# Patient Record
Sex: MALE | Race: BLACK OR AFRICAN AMERICAN | Employment: UNEMPLOYED | URBAN - METROPOLITAN AREA
[De-identification: names, ages, dates, MRNs, and addresses within clinical notes are randomized per-mention and may not be internally consistent; named-entity substitution may affect disease eponyms.]

---

## 2017-09-01 PROBLEM — G45.9 TIA (TRANSIENT ISCHEMIC ATTACK): Status: ACTIVE | Noted: 2017-09-01

## 2017-09-01 PROBLEM — I10 ESSENTIAL HYPERTENSION: Status: ACTIVE | Noted: 2017-09-01

## 2017-09-01 PROBLEM — I69.90 LATE EFFECTS OF CVA (CEREBROVASCULAR ACCIDENT): Status: ACTIVE | Noted: 2017-09-01

## 2017-09-01 PROBLEM — G62.9 PERIPHERAL NEUROPATHY: Status: ACTIVE | Noted: 2017-09-01

## 2017-09-01 PROBLEM — R55 SYNCOPE: Status: ACTIVE | Noted: 2017-09-01

## 2019-04-29 ENCOUNTER — HOSPITAL ENCOUNTER (OUTPATIENT)
Dept: CT IMAGING | Age: 59
Discharge: HOME | End: 2019-04-29
Payer: MEDICAID

## 2019-04-29 ENCOUNTER — HOSPITAL ENCOUNTER (OUTPATIENT)
Dept: NEUROLOGY | Age: 59
Discharge: HOME | End: 2019-04-29
Payer: MEDICAID

## 2019-04-29 DIAGNOSIS — R56.9 SEIZURE (HCC): ICD-10-CM

## 2019-04-29 PROCEDURE — 95816 EEG AWAKE AND DROWSY: CPT

## 2019-04-29 PROCEDURE — 95816 EEG AWAKE AND DROWSY: CPT | Performed by: PSYCHIATRY & NEUROLOGY

## 2019-04-29 PROCEDURE — 70450 CT HEAD/BRAIN W/O DYE: CPT

## 2019-04-30 NOTE — PROCEDURES
Patient: Darlene Pizarro    MR Number: [id number]  YOB: 1960  Date of Visit: 4/29/2019    Clinical History:  The patient is a 63y.o. years old male with possible recurrent seizures. Current Outpatient Medications   Medication Sig Dispense Refill    atorvastatin (LIPITOR) 40 MG tablet Take 1 tablet by mouth nightly 30 tablet 3    baclofen (LIORESAL) 10 MG tablet Take 0.5 tablets by mouth 2 times daily 60 tablet 0    aspirin 325 MG tablet Take 325 mg by mouth daily      divalproex (DEPAKOTE SPRINKLE) 125 MG capsule Take 125 mg by mouth 2 times daily      famotidine (PEPCID) 20 MG tablet Take 20 mg by mouth 2 times daily      FLUoxetine (PROZAC) 10 MG capsule Take 10 mg by mouth daily      folic acid (FOLVITE) 1 MG tablet Take 1 mg by mouth daily      gabapentin (NEURONTIN) 100 MG capsule Take 100 mg by mouth 3 times daily      medroxyPROGESTERone (PROVERA) 10 MG tablet Take 10 mg by mouth daily      Acetaminophen (TYLENOL) 325 MG CAPS Take 650 mg by mouth 4 times daily as needed (pain)       No current facility-administered medications for this encounter. Method: The EEG was performed utilizing the international 10/20 of electrode placements of both referential and bipolar montages. The patient was awake through out the recording. Findings: The background of the EEG showed normal alpha posterior background of  10- HZ and amplitude of 20-40 UV. This background was symmetric, waxing and waning, and reactive with eye opening and closure. The patient did not fall asleep during such recording. No spike or sharp waves were seen. PS and HV did not activate the EEG. Impression: This awake EEG  is within normal limits. There is no evidence of epileptiform discharges, focal, or lateralizing abnormalities.       Ricky Blackmon MD      Board certified in neurology, clinical neurophysiology, and sleep medicine

## 2020-01-31 ENCOUNTER — HOSPITAL ENCOUNTER (OUTPATIENT)
Dept: CT IMAGING | Age: 60
Discharge: HOME | End: 2020-01-31
Payer: MEDICAID

## 2020-01-31 PROCEDURE — 74176 CT ABD & PELVIS W/O CONTRAST: CPT

## 2020-02-27 ENCOUNTER — HOSPITAL ENCOUNTER (OUTPATIENT)
Dept: CT IMAGING | Age: 60
Discharge: HOME | End: 2020-02-27
Payer: MEDICAID

## 2020-02-27 PROCEDURE — 6360000004 HC RX CONTRAST MEDICATION: Performed by: INTERNAL MEDICINE

## 2020-02-27 PROCEDURE — 74174 CTA ABD&PLVS W/CONTRAST: CPT

## 2020-02-27 RX ADMIN — IOPAMIDOL 85 ML: 755 INJECTION, SOLUTION INTRAVENOUS at 08:18

## 2020-03-18 ENCOUNTER — HOSPITAL ENCOUNTER (OUTPATIENT)
Dept: GENERAL RADIOLOGY | Age: 60
Discharge: HOME | End: 2020-03-18
Payer: MEDICAID

## 2020-03-18 PROCEDURE — 74220 X-RAY XM ESOPHAGUS 1CNTRST: CPT

## 2020-05-23 ENCOUNTER — HOSPITAL ENCOUNTER (EMERGENCY)
Age: 60
Discharge: HOME | End: 2020-05-23
Payer: MEDICAID

## 2020-05-23 VITALS
BODY MASS INDEX: 23.78 KG/M2 | WEIGHT: 130 LBS | DIASTOLIC BLOOD PRESSURE: 52 MMHG | RESPIRATION RATE: 16 BRPM | TEMPERATURE: 98.1 F | SYSTOLIC BLOOD PRESSURE: 126 MMHG | OXYGEN SATURATION: 98 % | HEART RATE: 72 BPM

## 2020-05-23 PROCEDURE — 99284 EMERGENCY DEPT VISIT MOD MDM: CPT

## 2020-05-23 PROCEDURE — 6360000002 HC RX W HCPCS: Performed by: PHYSICIAN ASSISTANT

## 2020-05-23 PROCEDURE — 12013 RPR F/E/E/N/L/M 2.6-5.0 CM: CPT

## 2020-05-23 PROCEDURE — 90715 TDAP VACCINE 7 YRS/> IM: CPT | Performed by: PHYSICIAN ASSISTANT

## 2020-05-23 PROCEDURE — 90471 IMMUNIZATION ADMIN: CPT | Performed by: PHYSICIAN ASSISTANT

## 2020-05-23 PROCEDURE — 6370000000 HC RX 637 (ALT 250 FOR IP): Performed by: PHYSICIAN ASSISTANT

## 2020-05-23 RX ADMIN — Medication 2.25 ML: at 19:20

## 2020-05-23 RX ADMIN — TETANUS TOXOID, REDUCED DIPHTHERIA TOXOID AND ACELLULAR PERTUSSIS VACCINE, ADSORBED 0.5 ML: 5; 2.5; 8; 8; 2.5 SUSPENSION INTRAMUSCULAR at 19:21

## 2025-03-04 LAB
A/G RATIO: 1.3 RATIO (ref 0.8–2.6)
ALBUMIN: 3.9 G/DL (ref 3.5–5.2)
ALP BLD-CCNC: 117 U/L (ref 23–144)
ALT SERPL-CCNC: 28 U/L (ref 0–60)
AST SERPL-CCNC: 25 U/L (ref 0–55)
BASOPHILS ABSOLUTE: 0 K/UL (ref 0–0.3)
BASOPHILS RELATIVE PERCENT: 0.4 % (ref 0–2)
BILIRUB SERPL-MCNC: 0.2 MG/DL (ref 0–1.2)
BUN / CREAT RATIO: 10 (ref 7–25)
BUN BLDV-MCNC: 8 MG/DL (ref 3–29)
CALCIUM SERPL-MCNC: 9.4 MG/DL (ref 8.5–10.5)
CHLORIDE BLD-SCNC: 103 MEQ/L (ref 96–110)
CHOLESTEROL, TOTAL: 125 MG/DL
CO2: 24 MEQ/L (ref 19–32)
CREAT SERPL-MCNC: 0.8 MG/DL (ref 0.5–1.2)
DIFFERENTIAL COUNT: ABNORMAL
EOSINOPHILS ABSOLUTE: 0.6 K/UL (ref 0–0.5)
EOSINOPHILS RELATIVE PERCENT: 5.7 % (ref 0–5)
ESTIMATED AVERAGE GLUCOSE: 161 MG/DL
ESTIMATED GLOMERULAR FILTRATION RATE CREATININE EQUATION: 99 MLS/MIN/1.73M2
FASTING STATUS: ABNORMAL
GLOBULIN: 3.1 G/DL (ref 1.9–3.6)
GLUCOSE BLD-MCNC: 145 MG/DL (ref 70–99)
HBA1C MFR BLD: 6.7 %
HCT VFR BLD CALC: 45.3 % (ref 37.5–51)
HDLC SERPL-MCNC: 28 MG/DL
HEMOGLOBIN: 14.7 G/DL (ref 13–17.7)
IMMATURE GRANS (ABS): 0 K/UL (ref 0–0.1)
IMMATURE GRANULOCYTES %: 0.4 %
LDL CHOLESTEROL: 78 MG/DL
LYMPHOCYTES ABSOLUTE: 4.6 K/UL (ref 0.9–4.1)
LYMPHOCYTES RELATIVE PERCENT: 41.1 % (ref 14–51)
MCH RBC QN AUTO: 28.3 PG (ref 26–34)
MCHC RBC AUTO-ENTMCNC: 32.5 G/DL (ref 30.7–35.5)
MCV RBC AUTO: 87.1 FL (ref 80–100)
MONOCYTES ABSOLUTE: 0.9 K/UL (ref 0.2–1)
MONOCYTES RELATIVE PERCENT: 8.3 % (ref 4–12)
NEUTROPHILS ABSOLUTE: 4.9 K/UL (ref 1.8–7.5)
NEUTROPHILS RELATIVE PERCENT: 44.1 % (ref 42–80)
PDW BLD-RTO: 13.8 %
PLATELET # BLD: 152 K/UL (ref 140–400)
PMV BLD AUTO: 13.9 FL (ref 7.2–11.7)
POTASSIUM SERPL-SCNC: 4.4 MEQ/L (ref 3.4–5.3)
RBC # BLD: 5.2 M/UL (ref 4.14–5.8)
RETICULOCYTE ABSOLUTE COUNT: 0 /100 WBC
SODIUM BLD-SCNC: 141 MEQ/L (ref 135–148)
TOTAL PROTEIN: 7 G/DL (ref 6–8.3)
TRIGL SERPL-MCNC: 93 MG/DL
VLDLC SERPL CALC-MCNC: 19 MG/DL (ref 4–38)
WBC # BLD: 11.1 K/UL (ref 3.5–10.9)

## 2025-03-06 LAB — VALPROIC ACID, FREE: 8.2 MG/L (ref 4.8–17.3)

## 2025-04-21 LAB
A/G RATIO: 1.3 RATIO (ref 0.8–2.6)
ALBUMIN: 3.9 G/DL (ref 3.5–5.2)
ALP BLD-CCNC: 117 U/L (ref 23–144)
ALT SERPL-CCNC: 32 U/L (ref 0–60)
AST SERPL-CCNC: 31 U/L (ref 0–55)
BILIRUB SERPL-MCNC: 0.2 MG/DL (ref 0–1.2)
BUN / CREAT RATIO: 5 (ref 7–25)
BUN BLDV-MCNC: 5 MG/DL (ref 3–29)
CALCIUM SERPL-MCNC: 9.4 MG/DL (ref 8.5–10.5)
CHLORIDE BLD-SCNC: 101 MEQ/L (ref 96–110)
CO2: 27 MEQ/L (ref 19–32)
CREAT SERPL-MCNC: 1 MG/DL (ref 0.5–1.2)
ESTIMATED GLOMERULAR FILTRATION RATE CREATININE EQUATION: 85 MLS/MIN/1.73M2
FASTING STATUS: ABNORMAL
GLOBULIN: 3 G/DL (ref 1.9–3.6)
GLUCOSE BLD-MCNC: 90 MG/DL (ref 70–99)
HCT VFR BLD CALC: 44.8 % (ref 37.5–51)
HEMOGLOBIN: 14.6 G/DL (ref 13–17.7)
MCH RBC QN AUTO: 28.3 PG (ref 26–34)
MCHC RBC AUTO-ENTMCNC: 32.6 G/DL (ref 30.7–35.5)
MCV RBC AUTO: 87 FL (ref 80–100)
PDW BLD-RTO: 13.6 %
PLATELET # BLD: 197 K/UL (ref 140–400)
PMV BLD AUTO: 13.5 FL (ref 7.2–11.7)
POTASSIUM SERPL-SCNC: 4.5 MEQ/L (ref 3.4–5.3)
RBC # BLD: 5.15 M/UL (ref 4.14–5.8)
SODIUM BLD-SCNC: 142 MEQ/L (ref 135–148)
TOTAL PROTEIN: 6.9 G/DL (ref 6–8.3)
WBC # BLD: 12.3 K/UL (ref 3.5–10.9)

## 2025-04-25 LAB — VALPROIC ACID, FREE: 8.3 MG/L (ref 4.8–17.3)
